# Patient Record
Sex: MALE | Race: AMERICAN INDIAN OR ALASKA NATIVE | URBAN - METROPOLITAN AREA
[De-identification: names, ages, dates, MRNs, and addresses within clinical notes are randomized per-mention and may not be internally consistent; named-entity substitution may affect disease eponyms.]

---

## 2017-01-26 ENCOUNTER — OFFICE VISIT - RIVER FALLS (OUTPATIENT)
Dept: FAMILY MEDICINE | Facility: CLINIC | Age: 22
End: 2017-01-26

## 2017-01-26 ASSESSMENT — MIFFLIN-ST. JEOR: SCORE: 2098.45

## 2018-04-04 ENCOUNTER — COMMUNICATION - RIVER FALLS (OUTPATIENT)
Dept: FAMILY MEDICINE | Facility: CLINIC | Age: 23
End: 2018-04-04

## 2018-04-04 ENCOUNTER — OFFICE VISIT - RIVER FALLS (OUTPATIENT)
Dept: FAMILY MEDICINE | Facility: CLINIC | Age: 23
End: 2018-04-04

## 2018-04-04 ASSESSMENT — MIFFLIN-ST. JEOR: SCORE: 2184.63

## 2022-02-11 VITALS
TEMPERATURE: 98.1 F | HEART RATE: 81 BPM | BODY MASS INDEX: 32.39 KG/M2 | WEIGHT: 252.4 LBS | HEIGHT: 74 IN | OXYGEN SATURATION: 98 % | SYSTOLIC BLOOD PRESSURE: 118 MMHG | DIASTOLIC BLOOD PRESSURE: 72 MMHG

## 2022-02-11 VITALS
SYSTOLIC BLOOD PRESSURE: 108 MMHG | DIASTOLIC BLOOD PRESSURE: 52 MMHG | HEIGHT: 74 IN | WEIGHT: 233.4 LBS | TEMPERATURE: 97.9 F | HEART RATE: 64 BPM | BODY MASS INDEX: 29.95 KG/M2

## 2022-02-16 NOTE — PROGRESS NOTES
Patient:   KIM SIU            MRN: [id number]            FIN: [id number]               Age:   22 years     Sex:  Male     :  1995   Associated Diagnoses:   Encounter for examination required by Department of Transportation (DOT)   Author:   Deshawn Berrios MD      Results Review   General results   Today's results   2018 1:18 PM CDT Consent for Clinical Info Exchange Consent Granted     Consent for Immunization Exchange Consent Granted     Consent for Immunizations to Providers Consent Granted     Consents for Information Exchange Form Consents for Information Exchange Form     Consent Forms Consents for Information Exchange    2018 1:03 PM CDT Height Measured - Standard 74 in     Weight Measured - Standard 252.4 lb     BSA 2.44 m2     Body Mass Index 32.4 kg/m2  HI     Temperature Tympanic 98.1 DegF     Peripheral Pulse Rate 81 bpm     HR Method Electronic     Systolic Blood Pressure 118 mmHg     Diastolic Blood Pressure 72 mmHg     Mean Arterial Pressure 87 mmHg     BP Site Right arm     BP Method Manual     Oxygen Saturation 98 %     Allergies Verified? Yes     Medication History Verified? Yes     Tobacco Use? Never smoker     Pre-Visit Planning Status Completed     Corrective Lenses None     Eye, Right Visual Acuity 20/13     Eye, Left Visual Acuity 20/13     Eye, Bilateral Visual Acuity 20/13     Advance Directive No     Chief Complaint Patient presents for DOT physical exam. Also to get wrist checked.     Consent for Immunization Exchange Consent Granted     Consent for Immunizations to Providers Consent Granted     Comprehensive Intake Form Comprehensive Intake Form (Modified)    Intake Form Comprehensive Intake (Modified)         Health Status   Allergies:    Allergic Reactions (Selected)  No Known Medication Allergies   Medications:  (Selected)      Problem list:    All Problems  Obesity / 2669262288 / Probable      Subjective   Problem:  Please see scanned in copy of DOT physical       Objective   Vital Signs   4/4/2018 1:03 PM CDT Temperature Tympanic 98.1 DegF    Peripheral Pulse Rate 81 bpm    HR Method Electronic    Systolic Blood Pressure 118 mmHg    Diastolic Blood Pressure 72 mmHg    Mean Arterial Pressure 87 mmHg    BP Site Right arm    BP Method Manual    Oxygen Saturation 98 %      Measurements from flowsheet : Measurements   4/4/2018 1:03 PM CDT Height Measured - Standard 74 in    Weight Measured - Standard 252.4 lb    BSA 2.44 m2    Body Mass Index 32.4 kg/m2  HI         Plan   Impression and Plan:  Orders   .    Diagnosis     Encounter for examination required by Department of Transportation (DOT) ([id number] Z02.89).     .    2 year certificate.  he does have wrist injury with recent MRI showing ligament tear.  but good strength and able to use hands to drive without limitation

## 2022-02-16 NOTE — PROGRESS NOTES
Patient:   KIM SIU            MRN: [id number]            FIN: [id number]               Age:   21 years     Sex:  Male     :  1995   Associated Diagnoses:   Inguinal adenopathy   Author:   Stephen Cooper PA-C      Subjective   Chief complaint 2017 11:07 AM CST   swollen lymph node by hip since August, seen in [place] in August for this  .     Left inguinal adenopathy since last summer. Had US in [place]. Told to FU if it persisted. Other wise feels fine. No fever, chills, night sweats. No weight loss. No other apparent adenopathy. No easy bruising.       Health Status   Allergies:    Allergic Reactions (All)  No known allergies   Problem list:    All Problems  Obesity / SNOMED CT [id number] / Probable      Objective   Vital Signs   2017 11:07 AM CST Temperature Tympanic 97.9 DegF    Peripheral Pulse Rate 64 bpm    Pulse Site Radial artery    HR Method Manual    Systolic Blood Pressure 108 mmHg    Diastolic Blood Pressure 52 mmHg  LOW    Mean Arterial Pressure 71 mmHg    BP Site Left arm    BP Method Manual      Measurements from flowsheet : Measurements   2017 11:07 AM CST Height Measured - Standard 74 in    Weight Measured - Standard 233.4 lb    BSA 2.35 m2    Body Mass Index 29.96 kg/m2      Lymphatics:  1 cm mobile node in left inguinal region. No obvious skin abnormalities in this area. No adenopathy in head, neck, chest and axilla..       Impression and Plan   Assessment and Plan:          Diagnosis: Inguinal adenopathy ([id number] R59.0).    Orders     Orders (Selected)   Outpatient Orders  Ordered  US Inguinal (Request): Priority: Routine, Instructions: Persisteint left inguinal adenopathy Patient wants US in [place], Inguinal adenopathy.     .    Will get US and FU.

## 2022-02-16 NOTE — TELEPHONE ENCOUNTER
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 He was wondering if a new card could be made or if a copy could be mailed to him.   M for patient to call back and update address.  Please advise    Last office visit and reason:  04/04/18, DOT---------------------  From: Susi Leblanc MA ([facility] Message Pool ([id number]))   To: Deshawn Berrios MD;     Sent: 3/18/2019 1:26:38 PM CDT  Subject: FW: General Message     Please advise     He was last seen for a DOT exam on 4-4-18    He lost his card once as his dog ate it so we gave him a new one.     Is this ok to fill out another one? And he would need to come in to sign it?---------------------  From: Deshawn Berrios MD   To: [facility] Message Pool ([id number]_[place] [facility] [place]);     Sent: 3/18/2019 1:55:46 PM CDT  Subject: RE: General Message     if it was a 2 year card ok to update .  if it was a 1 year card he is due for update anywayPatient returned call and asked for just a copy to be mailed to him, Card printed from file and sent to the address of  [facility] [id number]